# Patient Record
Sex: MALE | Race: WHITE | NOT HISPANIC OR LATINO | URBAN - METROPOLITAN AREA
[De-identification: names, ages, dates, MRNs, and addresses within clinical notes are randomized per-mention and may not be internally consistent; named-entity substitution may affect disease eponyms.]

---

## 2024-12-31 ENCOUNTER — OFFICE VISIT (OUTPATIENT)
Dept: DERMATOLOGY | Facility: CLINIC | Age: 59
End: 2024-12-31
Payer: COMMERCIAL

## 2024-12-31 DIAGNOSIS — L71.9 ROSACEA: ICD-10-CM

## 2024-12-31 DIAGNOSIS — Z85.820 PERSONAL HISTORY OF MALIGNANT MELANOMA OF SKIN: ICD-10-CM

## 2024-12-31 DIAGNOSIS — L82.1 SEBORRHEIC KERATOSIS: ICD-10-CM

## 2024-12-31 DIAGNOSIS — L57.8 DIFFUSE PHOTODAMAGE OF SKIN: ICD-10-CM

## 2024-12-31 DIAGNOSIS — D48.5 NEOPLASM OF UNCERTAIN BEHAVIOR OF SKIN: Primary | ICD-10-CM

## 2024-12-31 DIAGNOSIS — D18.01 HEMANGIOMA OF SKIN: ICD-10-CM

## 2024-12-31 DIAGNOSIS — D22.5 MELANOCYTIC NEVUS OF TRUNK: ICD-10-CM

## 2024-12-31 PROCEDURE — 11311 SHAVE SKIN LESION 0.6-1.0 CM: CPT | Performed by: DERMATOLOGY

## 2024-12-31 PROCEDURE — 11306 SHAVE SKIN LESION 0.6-1.0 CM: CPT | Performed by: DERMATOLOGY

## 2024-12-31 PROCEDURE — 99204 OFFICE O/P NEW MOD 45 MIN: CPT | Performed by: DERMATOLOGY

## 2024-12-31 RX ORDER — TADALAFIL 5 MG/1
5 TABLET ORAL
COMMUNITY
Start: 2024-09-12

## 2024-12-31 RX ORDER — PROPRANOLOL HYDROCHLORIDE 80 MG/1
80 CAPSULE, EXTENDED RELEASE ORAL 2 TIMES DAILY
COMMUNITY
Start: 2024-10-30 | End: 2025-01-28

## 2024-12-31 RX ORDER — LISINOPRIL 10 MG/1
10 TABLET ORAL
COMMUNITY
Start: 2024-09-30

## 2024-12-31 RX ORDER — METRONIDAZOLE 7.5 MG/G
CREAM TOPICAL
Qty: 45 G | Refills: 11 | Status: SHIPPED | OUTPATIENT
Start: 2024-12-31

## 2024-12-31 RX ORDER — ALFUZOSIN HYDROCHLORIDE 10 MG/1
1 TABLET, EXTENDED RELEASE ORAL NIGHTLY
COMMUNITY
Start: 2017-05-30

## 2024-12-31 RX ORDER — TRAZODONE HYDROCHLORIDE 50 MG/1
50 TABLET ORAL
COMMUNITY

## 2024-12-31 RX ORDER — SERTRALINE HYDROCHLORIDE 50 MG/1
50 TABLET, FILM COATED ORAL
COMMUNITY
Start: 2024-10-30

## 2024-12-31 RX ORDER — ATORVASTATIN CALCIUM 20 MG/1
1 TABLET, FILM COATED ORAL
COMMUNITY
Start: 2024-04-04

## 2024-12-31 ASSESSMENT — DERMATOLOGY QUALITY OF LIFE (QOL) ASSESSMENT: ARE THERE EXCLUSIONS OR EXCEPTIONS FOR THE QUALITY OF LIFE ASSESSMENT: NO

## 2024-12-31 ASSESSMENT — ITCH NUMERIC RATING SCALE: HOW SEVERE IS YOUR ITCHING?: 0

## 2024-12-31 ASSESSMENT — DERMATOLOGY PATIENT ASSESSMENT
WHERE ARE THESE NEW OR CHANGING LESIONS LOCATED: LEFT EAR
DO YOU USE SUNSCREEN: OCCASIONALLY
DO YOU USE A TANNING BED: NO
DO YOU HAVE ANY NEW OR CHANGING LESIONS: YES

## 2024-12-31 NOTE — PROGRESS NOTES
Subjective     Zain Bustillos is a 59 y.o. male who presents for the following: Skin Check.  The patient and his wife report the scaly bump on his left ear has been present for 6 months, has increased in size, and hurts to touch.  He also notes intermittent pimple breakouts on his face.  He denies any other new, changing, or concerning skin lesions; no bleeding, itching, or burning lesions.      Review of Systems:  No other skin or systemic complaints other than what is documented elsewhere in the note.    The following portions of the chart were reviewed this encounter and updated as appropriate:       Skin Cancer History  No skin cancer on file.    Specialty Problems    None      Past Dermatologic / Past Relevant Medical History:    - reported history of melanoma, the Breslow depth of which he cannot recall, on his right proximal forearm diagnosed and treated with wide local excision by his previous outside Dermatologist, Dr. Harshil Strickland, in approximately 2005, according to the patient, and for which no sentinel lymph node biopsy or any adjuvant treatment was necessary at that time, according to the patient and his wife    Family History:    No family history of melanoma or skin cancer    Social History:    The patient states he works as the  for [employer] and lives in [place] with his wife, Ciara, who states she grew up in [place] and has a daughter, Ann Marie, who grew up in [place]    Allergies:  Patient has no known allergies.    Current Medications / CAM's:    Current Outpatient Medications:     alfuzosin (Uroxatral) 10 mg 24 hr tablet, Take 1 tablet (10 mg) by mouth once daily at bedtime., Disp: , Rfl:     atorvastatin (Lipitor) 20 mg tablet, Take 1 tablet (20 mg) by mouth early in the morning.., Disp: , Rfl:     lisinopril 10 mg tablet, 1 tablet (10 mg)., Disp: , Rfl:     propranolol LA (Inderal LA) 80 mg 24 hr capsule, Take 1 capsule (80 mg) by mouth twice a day., Disp: , Rfl:      sertraline (Zoloft) 50 mg tablet, Take 1 tablet (50 mg) by mouth once daily., Disp: , Rfl:     tadalafil (Cialis) 5 mg tablet, Take 1 tablet (5 mg) by mouth once daily., Disp: , Rfl:     traZODone (Desyrel) 50 mg tablet, Take 1 tablet (50 mg) by mouth., Disp: , Rfl:     metroNIDAZOLE (Metrocream) 0.75 % cream, Apply twice daily to affected areas of face, Disp: 45 g, Rfl: 11     Objective   Well appearing patient in no apparent distress; mood and affect are within normal limits.    A full examination was performed including scalp, face, eyes, ears, nose, lips, neck, chest, axillae, abdomen, back, bilateral upper extremities, and bilateral lower extremities. All findings within normal limits unless otherwise noted below.    Assessment/Plan   1. Neoplasm of uncertain behavior of skin (2)  Left Ear Antihelix  7 mm erythematous, scaly papule               Shave removal    Lesion length (cm):  0.7  Margin per side (cm):  0  Lesion diameter (cm):  0.7  Informed consent: discussed and consent obtained    Timeout: patient name, date of birth, surgical site, and procedure verified    Procedure prep:  Patient was prepped and draped  Anesthesia: the lesion was anesthetized in a standard fashion    Anesthetic:  1% lidocaine w/ epinephrine 1-100,000 local infiltration  Instrument used: flexible razor blade    Hemostasis achieved with: aluminum chloride    Outcome: patient tolerated procedure well    Post-procedure details: sterile dressing applied and wound care instructions given    Dressing type: bandage and petrolatum      Staff Communication: Dermatology Local Anesthesia: 1 % Lidocaine / Epinephrine - Amount:0.5ml    Specimen 1 - Dermatopathology- DERM LAB  Differential Diagnosis: HAK v SCCIS v SCC v CNH  Check Margins Yes/No?:    Comments:    Dermpath Lab: Routine Histopathology (formalin-fixed tissue)    Left Lateral Neck  5 mm dark brown pigmented, asymmetric macule with an asymmetric pigment network and irregular borders                Shave removal    Lesion length (cm):  0.5  Margin per side (cm):  0.2  Lesion diameter (cm):  0.9  Informed consent: discussed and consent obtained    Timeout: patient name, date of birth, surgical site, and procedure verified    Procedure prep:  Patient was prepped and draped  Anesthesia: the lesion was anesthetized in a standard fashion    Anesthetic:  1% lidocaine w/ epinephrine 1-100,000 local infiltration  Instrument used: flexible razor blade    Hemostasis achieved with: aluminum chloride    Outcome: patient tolerated procedure well    Post-procedure details: sterile dressing applied and wound care instructions given    Dressing type: bandage and petrolatum      Staff Communication: Dermatology Local Anesthesia: 1 % Lidocaine / Epinephrine - Amount:0.5ml    Specimen 2 - Dermatopathology- DERM LAB  Differential Diagnosis: DN  Check Margins Yes/No?:    Comments:    Dermpath Lab: Routine Histopathology (formalin-fixed tissue)    2. Melanocytic nevus of trunk  Scattered on the patient's face, neck, trunk, and extremities, there are several small, round- to oval-shaped, brown-pigmented and pink-colored, symmetric, uniform-appearing macules and dome-shaped papules    Clinically benign- to slightly atypical-appearing nevi - the clinically benign- to slightly atypical-appearing nature of the remainder of the patient's nevi was discussed with the patient today.  None of the patient's nevi, with the exception of the one noted above, meet threshold for biopsy today.  I emphasized the importance of performing monthly self-skin exams using the ABCDs of monitoring moles, which were reviewed with the patient today and an informational hand-out provided.  I also emphasized the importance of sun avoidance and sun protection with daily sunscreen use.    3. Seborrheic keratosis  Scattered on the patient's face, neck, trunk, and extremities, there are multiple tan- to light brown-colored, hyperkeratotic, stuck-on  appearing papules of varying size and shape    Seborrheic Keratoses - the benign nature of these lesions was discussed with the patient today and reassurance provided.  No treatment is medically indicated for these lesions at this time.    4. Hemangioma of skin  Scattered on the patient's face, neck, trunk, and extremities, there are multiple small, round, cherry red- to purplish-colored, symmetric, uniform, vascular-appearing macules and papules    Cherry Angiomas - the benign nature of these vascular lesions was discussed with the patient today and reassurance provided.  No treatment is medically indicated for these lesions at this time.    5. Rosacea  Head - Anterior (Face)  On the patient's face, most prominent over the bilateral medial cheeks and nose, there is moderate underlying erythema with several overlying telangiectasias and a few scattered erythematous, inflammatory papules     Rosacea -papulo-pustular type.  The chronic and intermittently flaring nature of this condition and treatment options were discussed extensively with the patient today.  At this time, I recommend topical therapy with MetroCream 0.75%, which the patient was instructed to apply twice daily to the affected areas of the face.  I also discussed the various triggers of rosacea with the patient today, especially sun exposure, and emphasized the importance of trigger avoidance, particularly sun avoidance and sun protection with daily sunscreen use.  The risks, benefits, and side effects of this medication were discussed.  The patient expressed understanding and is in agreement with this plan.    metroNIDAZOLE (Metrocream) 0.75 % cream - Head - Anterior (Face)  Apply twice daily to affected areas of face    6. Personal history of malignant melanoma of skin  The sites of prior melanoma excision were examined.  There is no evidence of recurrent growth or pigmentation or recurrent disease, satellite papules, or nodules on exam  today.    Reported history of melanoma and photodamage.  There is no evidence of local, regional, or distant recurrent disease or any new primary melanomas on exam today.  I emphasized the importance of continuing to perform monthly self-skin and lymph node exams using the ABCDs of monitoring moles, which were reviewed with the patient, as well as the importance of sun avoidance and sun protection with daily sunscreen use.  I will have the patient return to our office in 1 year, pending the above biopsy results, for routine melanoma follow-up and total body skin exam, and the patient was instructed to call our office should the patient notice any new, changing, symptomatic, or otherwise concerning skin lesions before then.  The patient expressed understanding and is in agreement with this plan.    7. Diffuse photodamage of skin  Photodistributed  Diffuse photodamage with actinic changes with telangiectasia and mottled pigmentation in sun-exposed areas.    Photodamage.  The signs and symptoms of skin cancer were reviewed and the patient was advised to practice sun protection and sun avoidance, use daily sunscreen, and perform regular self skin exams.  Sun protection was discussed, including avoiding the mid-day sun, wearing a sunscreen with SPF at least 50, and stressing the need for reapplication of sunscreen and applying more than they think they need.

## 2025-01-06 LAB
LAB AP ASR DISCLAIMER: NORMAL
LABORATORY COMMENT REPORT: NORMAL
PATH REPORT.FINAL DX SPEC: NORMAL
PATH REPORT.GROSS SPEC: NORMAL
PATH REPORT.MICROSCOPIC SPEC OTHER STN: NORMAL
PATH REPORT.RELEVANT HX SPEC: NORMAL
PATH REPORT.TOTAL CANCER: NORMAL

## 2025-01-07 ENCOUNTER — TELEPHONE (OUTPATIENT)
Dept: DERMATOLOGY | Facility: CLINIC | Age: 60
End: 2025-01-07
Payer: COMMERCIAL

## 2025-01-28 ENCOUNTER — APPOINTMENT (OUTPATIENT)
Dept: DERMATOLOGY | Facility: CLINIC | Age: 60
End: 2025-01-28
Payer: COMMERCIAL

## 2025-01-28 DIAGNOSIS — D04.22 SQUAMOUS CELL CARCINOMA IN SITU (SCCIS) OF SKIN OF ANTIHELIX OF LEFT EAR: Primary | ICD-10-CM

## 2025-01-28 PROCEDURE — 17312 MOHS ADDL STAGE: CPT | Performed by: DERMATOLOGY

## 2025-01-28 PROCEDURE — 17311 MOHS 1 STAGE H/N/HF/G: CPT | Performed by: DERMATOLOGY

## 2025-01-28 PROCEDURE — 99214 OFFICE O/P EST MOD 30 MIN: CPT | Performed by: DERMATOLOGY

## 2025-01-28 NOTE — PROGRESS NOTES
Mohs Surgery Operative Note    Date of Surgery:  1/28/2025  Surgeon:  Iwona Servin MD  Office Location:  [address] [facility]  [address] [facility]  [address]  [facility] [address]  Dept: [phone]  Dept Fax: [phone]  Referring Provider: Kee Stevens MD  [address]  [place] [address]      Assessment/Plan   Pre-procedure:   Obtained informed consent: written from patient  The surgical site was identified and confirmed with the patient.     Intra-operative:   Audible time out called at : 10:55 AM 01/28/25  by: Wilman Munguia MA   Verified patient name, birthdate, site, specimen bottle label & requisition.    The planned procedure(s) was again reviewed with the patient. The risks of bleeding, infection, nerve damage and scarring were reviewed. Written authorization was obtained. The patient identity, surgical site, and planned procedure(s) were verified. The provider acted as both surgeon and pathologist.     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  Left Ear Antihelix  Mohs surgery  Consent obtained: written    Universal Protocol:  Procedure explained and questions answered to patient or proxy's satisfaction: Yes    Test results available and properly labeled: Yes    Pathology report reviewed: Yes    External notes reviewed: Yes    Photo or diagram used for site identification: Yes    Site/side marked: Yes    Slide independently reviewed by Mohs surgeon: Yes    Immediately prior to procedure a time out was called: Yes    Patient identity confirmed: verbally with patient  Preparation: Patient was prepped and draped in usual sterile fashion      Anticoagulation:  Was the anticoagulation regimen changed prior to Mohs? No      Anesthesia:  Anesthesia method: local infiltration  Local anesthetic: lidocaine 2% WITH epi    Procedure Details:  Case ID Number: [id number]  Biopsy accession number: [id number]  Date of  biopsy: 12/31/2024  Pre-Op diagnosis: squamous cell carcinoma  SCC subtype: in situ  Surgical site (from skin exam): Left Ear Antihelix  Indications for Mohs surgery: anatomic location where tissue conservation is critical  Previously treated? No      Micrographic Surgery Details:  Post-operative length (cm): 0.8  Post-operative width (cm): 1.3  Number of Mohs stages: 2    Stage 1     Comments: The patient was brought into the operating room and placed in the procedure chair in the appropriate position.  The area positive by previous biopsy was identified and confirmed with the patient. The area of clinically obvious tumor was debulked using a curette and/or scalpel as needed. An incision was made following the Mohs approach through the skin. The specimen was taken to the lab, divided into 1 piece(s) and appropriately chromacoded and processed..  Tumor was seen on the lateral margins as indicated on the on the Mohs map.  Squamous cell carcinoma in situ. Histologic examination revealed enlarged, atypical keratinocytes with large nuclear to cytoplasmic ratio extending throughout the full thickness of an epidermis.      Tumor features identified on Mohs section: Squamous Cell Carcinoma in Situ      Depth of invasion: Epidermis    Stage 2     Comments: The area of positivity as noted on the Mohs map in the previous stage was identified and removed using the Mohs technique. The specimen was taken to the lab and appropriately chromacoded and processed in 1 piece(s).     Tumor features identified on Mohs section: no tumor identified  Depth of defect: subcutaneous fat    Patient tolerance of procedure: tolerated well, no immediate complications    Reconstruction:  Was the defect reconstructed?: No   Repair: After a discussion with the patient regarding the options for wound closure, a decision was made to proceed with second intention healing.  Dressing/Follow-up: Surgifoam was placed in the wound. A pressure dressing was  placed to help stabilize the wound and to minimize the risk of postoperative bleeding. Wound care was discussed, and the patient was given written post-operative wound care instructions.  Hemostasis achieved with: pressure and Gelfoam  Outcome: patient tolerated procedure well with no complications    Post-procedure details: sterile dressing applied and wound care instructions given    Dressing type: Gelfoam and pressure dressing    Additional details:  Repair: After a discussion with the patient regarding the options for wound closure, a decision was made to proceed with second intention healing.  Dressing F/U: Surgifoam was placed in the wound. A pressure dressing was placed to help stabilize the wound and to minimize the risk of postoperative bleeding. Wound care was discussed, and the patient was given written post-operative wound care instructions.    The final repair measured 0.8 x 1.3 cm          Wound care was discussed, and the patient was given written post-operative wound care instructions.      The patient will follow up with Iwona Servin MD as needed for any post operative problems or concerns, and will follow up with their primary dermatologist as scheduled.

## 2025-01-28 NOTE — PROGRESS NOTES
Office Visit Note  Date: 1/28/2025  Surgeon:  Iwona Servin MD  Office Location: DO [address] [facility]  [address]  [address]  [facility] [address]  Dept: [phone]  Dept Fax: [phone]  Referring Provider: Kee Stevens MD  [address], [address]  [place] [address]    Subjective   Zain Bustillos is a 59 y.o. male who presents for the following: MOHS Surgery    According to the patient, the lesion has been present for approximately greater than 1 year at the time of diagnosis.  The lesion is painful.  The lesion has not been treated previously.    The patient does not have a pacemaker / defibrillator.  The patient does not have a heart valve / joint replacement.    The patient is not on blood thinners.  The patient does not have a history of hepatitis B or C.  The patient does not have a history of HIV.  The patient does not have a history of immunosuppression (e.g. organ transplantation, malignancy, medications)    Review of Systems:  No other skin or systemic complaints other than what is documented elsewhere in the note.    MEDICAL HISTORY: clinically relevant history including significant past medical history, medications and allergies was reviewed and documented in Epic.    Objective   Well appearing patient in no apparent distress; mood and affect are within normal limits.  Vital signs: See record.  Noted on the Left Antihelix  Is a 0.9 x 0.6 cm scar      The patient confirmed the identified site.    Discussion:  The nature of the diagnosis was explained. The lesion is a skin cancer.  It has a risk of local growth and distant spread. The condition is associated with sun exposure.  Warning signs of non-melanoma skin cancer discussed. Patient was instructed to perform monthly self skin examination.  We recommended that the patient have regular full skin exams given an increased risk of subsequent skin cancers. The patient was instructed to use  sun protective behaviors including use of broad spectrum sunscreens and sun protective clothing to reduce risk of skin cancers.      Risks, benefits, side effects of Mohs surgery were discussed with patient and the patient voiced understanding.  It was explained that even though the cure rate of Mohs is very high it is not 100%. Risks of surgery including but not limited to bleeding, infection, numbness, nerve damage, and scar were reviewed.  Discussion included wound care requirements, activity restrictions, likely scar outcome and time to heal.     After Mohs surgery, the defect may need to be repaired surgically and the scar may be longer than the original lesion.  Reconstruction options, risks, and benefits were reviewed including second intention healing, linear repair (4-1 ratio was explained), local flaps, skin grafts, cartilage grafts and interpolation flaps (the need for multiple surgeries was explained). Possible outcomes were reviewed including likely scar appearance, failure of flap survival, infection, bleeding and the need for revision surgery.     The pathology was reviewed, the photograph was reviewed, and the referring physician's note was reviewed.    Patient elected for Mohs surgery.

## 2025-03-11 ENCOUNTER — APPOINTMENT (OUTPATIENT)
Dept: DERMATOLOGY | Facility: CLINIC | Age: 60
End: 2025-03-11
Payer: COMMERCIAL

## 2025-05-27 ENCOUNTER — OFFICE VISIT (OUTPATIENT)
Dept: URGENT CARE | Facility: URGENT CARE | Age: 60
End: 2025-05-27
Payer: COMMERCIAL

## 2025-05-27 VITALS
SYSTOLIC BLOOD PRESSURE: 146 MMHG | RESPIRATION RATE: 18 BRPM | TEMPERATURE: 99.1 F | HEART RATE: 104 BPM | BODY MASS INDEX: 36.16 KG/M2 | OXYGEN SATURATION: 95 % | WEIGHT: 274.03 LBS | DIASTOLIC BLOOD PRESSURE: 97 MMHG

## 2025-05-27 DIAGNOSIS — J02.9 SORE THROAT: ICD-10-CM

## 2025-05-27 DIAGNOSIS — J01.00 ACUTE NON-RECURRENT MAXILLARY SINUSITIS: Primary | ICD-10-CM

## 2025-05-27 LAB
POC HUMAN RHINOVIRUS PCR: POSITIVE
POC INFLUENZA A VIRUS PCR: NEGATIVE
POC INFLUENZA B VIRUS PCR: NEGATIVE
POC RESPIRATORY SYNCYTIAL VIRUS PCR: NEGATIVE
POC STREPTOCOCCUS PYOGENES (GROUP A STREP) PCR: NEGATIVE

## 2025-05-27 RX ORDER — AMOXICILLIN AND CLAVULANATE POTASSIUM 875; 125 MG/1; MG/1
875 TABLET, FILM COATED ORAL 2 TIMES DAILY
Qty: 20 TABLET | Refills: 0 | Status: SHIPPED | OUTPATIENT
Start: 2025-05-27

## 2025-05-27 ASSESSMENT — ENCOUNTER SYMPTOMS
SINUS PAIN: 1
DIARRHEA: 0
RHINORRHEA: 1
SORE THROAT: 1
FEVER: 1
NAUSEA: 0
VOMITING: 0
HEADACHES: 1
FATIGUE: 1
SINUS PRESSURE: 1
CHILLS: 0
COUGH: 1
WHEEZING: 0

## 2025-05-27 NOTE — PROGRESS NOTES
Subjective   Patient ID: Zain Bustillos is a 60 y.o. male. They present today with a chief complaint of Illness (Sore throat, headaches, congestion since last Thursday, worsening in the last 2 days.).    History of Present Illness  See mdm      History provided by:  Patient      Past Medical History  Allergies as of 05/27/2025    (No Known Allergies)       Prescriptions Prior to Admission[1]     Medical History[2]    Surgical History[3]     reports that he has never smoked. He has never used smokeless tobacco.    Review of Systems  Review of Systems   Constitutional:  Positive for fatigue and fever. Negative for chills.   HENT:  Positive for congestion, postnasal drip, rhinorrhea, sinus pressure, sinus pain and sore throat. Negative for nosebleeds.    Respiratory:  Positive for cough. Negative for wheezing.    Cardiovascular:  Negative for chest pain.   Gastrointestinal:  Negative for diarrhea, nausea and vomiting.   Neurological:  Positive for headaches.   All other systems reviewed and are negative.                                 Objective    Vitals:    05/27/25 1936   BP: (!) 146/97   BP Location: Left arm   Patient Position: Sitting   BP Cuff Size: Adult long   Pulse: 104   Resp: 18   Temp: 37.3 °C (99.1 °F)   TempSrc: Oral   SpO2: 95%   Weight: 124 kg (274 lb 0.5 oz)     No LMP for male patient.    Physical Exam  Vitals and nursing note reviewed.   HENT:      Head: Normocephalic.      Right Ear: Ear canal normal.      Left Ear: Ear canal normal.      Nose: Nasal tenderness, mucosal edema, congestion and rhinorrhea present. Rhinorrhea is purulent.      Right Turbinates: Enlarged and swollen.      Left Turbinates: Enlarged and swollen.      Right Sinus: Maxillary sinus tenderness and frontal sinus tenderness present.      Left Sinus: Maxillary sinus tenderness and frontal sinus tenderness present.      Mouth/Throat:      Lips: Pink.      Mouth: Mucous membranes are moist.      Pharynx: Oropharynx is clear.  Uvula midline.   Cardiovascular:      Rate and Rhythm: Normal rate and regular rhythm.      Heart sounds: Normal heart sounds.   Pulmonary:      Effort: Pulmonary effort is normal.      Breath sounds: Normal breath sounds.   Neurological:      General: No focal deficit present.      Mental Status: He is alert and oriented to person, place, and time.         Procedures    Point of Care Test & Imaging Results from this visit  No results found for this visit on 05/27/25.   Imaging  No results found.    Cardiology, Vascular, and Other Imaging  No other imaging results found for the past 2 days      Diagnostic study results (if any) were reviewed by Crystal L Severino, APRN-CNP.    Assessment/Plan   Allergies, medications, history, and pertinent labs/EKGs/Imaging reviewed by Crystal L Severino, APRN-CNP.     Medical Decision Making  60-year-old male patient presents with chief complaint of sore throat, headaches, congestion that all started last Thursday.  He states over the last couple of days he has had an increase in his cough with production of brown discolored sputum.  He states he is having a lot of thick yellow discharge from his nose as well.  And he has just began to start running a fever.  States he has been taking over-the-counter Tylenol and ibuprofen and some cough suppressants which are not working at this time.    Orders and Diagnoses  Diagnoses and all orders for this visit:  Acute non-recurrent maxillary sinusitis  -     amoxicillin-clavulanate (Augmentin) 875-125 mg tablet; Take 1 tablet (875 mg) by mouth 2 times a day.  Sore throat  -     POCT SPOTFIRE R/ST Panel Mini w/Strep A ([facility]) manually resulted   Post Nasal Drip:  Claritin or Zyrtec everyday for one week   Humidified air  OTC nasal spray as needed   Mucinex DM  Tylenol/ibuprofen as needed for pain/discomfort      Medical Admin Record      Patient disposition: Home    Electronically signed by Crystal L Severino, APRN-CNP  7:57 PM            [1] (Not in a hospital admission)  [2]   Past Medical History:  Diagnosis Date    Disorder of urinary system, unspecified     Urinary problem in male    Personal history of other diseases of the circulatory system     History of hypertension    Personal history of other mental and behavioral disorders     History of depression    Tremor, unspecified     Occasional tremors   [3] No past surgical history on file.

## 2025-06-24 ENCOUNTER — APPOINTMENT (OUTPATIENT)
Dept: DERMATOLOGY | Facility: CLINIC | Age: 60
End: 2025-06-24
Payer: COMMERCIAL

## 2025-06-28 ENCOUNTER — OFFICE VISIT (OUTPATIENT)
Dept: URGENT CARE | Facility: URGENT CARE | Age: 60
End: 2025-06-28
Payer: COMMERCIAL

## 2025-06-28 VITALS
HEART RATE: 89 BPM | BODY MASS INDEX: 35.96 KG/M2 | OXYGEN SATURATION: 95 % | TEMPERATURE: 100 F | RESPIRATION RATE: 18 BRPM | DIASTOLIC BLOOD PRESSURE: 83 MMHG | WEIGHT: 272.49 LBS | SYSTOLIC BLOOD PRESSURE: 127 MMHG

## 2025-06-28 DIAGNOSIS — J98.8 WHEEZING-ASSOCIATED RESPIRATORY INFECTION: Primary | ICD-10-CM

## 2025-06-28 DIAGNOSIS — J02.9 SORE THROAT: ICD-10-CM

## 2025-06-28 DIAGNOSIS — H10.13 ALLERGIC CONJUNCTIVITIS OF BOTH EYES: ICD-10-CM

## 2025-06-28 DIAGNOSIS — J01.00 ACUTE NON-RECURRENT MAXILLARY SINUSITIS: ICD-10-CM

## 2025-06-28 DIAGNOSIS — Z20.822 EXPOSURE TO COVID-19 VIRUS: ICD-10-CM

## 2025-06-28 LAB
POC HUMAN RHINOVIRUS PCR: NEGATIVE
POC INFLUENZA A VIRUS PCR: NEGATIVE
POC INFLUENZA B VIRUS PCR: NEGATIVE
POC RESPIRATORY SYNCYTIAL VIRUS PCR: NEGATIVE
POC STREPTOCOCCUS PYOGENES (GROUP A STREP) PCR: NEGATIVE

## 2025-06-28 RX ORDER — ALBUTEROL SULFATE 90 UG/1
2 INHALANT RESPIRATORY (INHALATION) EVERY 4 HOURS PRN
Qty: 8.5 G | Refills: 0 | Status: SHIPPED | OUTPATIENT
Start: 2025-06-28 | End: 2026-06-28

## 2025-06-28 RX ORDER — INHALER, ASSIST DEVICES
SPACER (EA) MISCELLANEOUS
Qty: 1 EACH | Refills: 0 | Status: SHIPPED | OUTPATIENT
Start: 2025-06-28 | End: 2026-06-28

## 2025-06-28 RX ORDER — OLOPATADINE HYDROCHLORIDE 1 MG/ML
1 SOLUTION OPHTHALMIC 2 TIMES DAILY
Qty: 5 ML | Refills: 1 | Status: SHIPPED | OUTPATIENT
Start: 2025-06-28 | End: 2025-07-28

## 2025-06-28 RX ORDER — PRIMIDONE 50 MG/1
50 TABLET ORAL NIGHTLY
COMMUNITY
Start: 2025-04-10 | End: 2025-07-09

## 2025-06-28 RX ORDER — DOXYCYCLINE 100 MG/1
100 CAPSULE ORAL 2 TIMES DAILY
Qty: 20 CAPSULE | Refills: 0 | Status: SHIPPED | OUTPATIENT
Start: 2025-06-28 | End: 2025-07-08

## 2025-06-28 RX ORDER — PREDNISONE 10 MG/1
40 TABLET ORAL DAILY
Qty: 20 TABLET | Refills: 0 | Status: SHIPPED | OUTPATIENT
Start: 2025-06-28 | End: 2025-07-03

## 2025-06-28 RX ORDER — LORATADINE 10 MG/1
10 TABLET ORAL DAILY
COMMUNITY

## 2025-06-28 RX ORDER — FLUTICASONE PROPIONATE 50 MCG
1 SPRAY, SUSPENSION (ML) NASAL DAILY
COMMUNITY

## 2025-06-28 RX ORDER — DOXYCYCLINE 100 MG/1
100 CAPSULE ORAL 2 TIMES DAILY
Qty: 20 CAPSULE | Refills: 0 | Status: SHIPPED | OUTPATIENT
Start: 2025-06-28 | End: 2025-06-28

## 2025-06-28 RX ORDER — PREDNISONE 20 MG/1
40 TABLET ORAL DAILY
Qty: 10 TABLET | Refills: 0 | Status: SHIPPED | OUTPATIENT
Start: 2025-06-28 | End: 2025-06-28 | Stop reason: ALTCHOICE

## 2025-06-28 RX ORDER — ALBUTEROL SULFATE 0.83 MG/ML
2.5 SOLUTION RESPIRATORY (INHALATION) ONCE
Status: COMPLETED | OUTPATIENT
Start: 2025-06-28 | End: 2025-06-28

## 2025-06-28 RX ADMIN — ALBUTEROL SULFATE 2.5 MG: 0.83 SOLUTION RESPIRATORY (INHALATION) at 19:21

## 2025-06-28 ASSESSMENT — ENCOUNTER SYMPTOMS
LOSS OF SENSATION IN FEET: 0
FEVER: 1
COUGH: 1
SORE THROAT: 1
DEPRESSION: 0
OCCASIONAL FEELINGS OF UNSTEADINESS: 0

## 2025-06-28 NOTE — PROGRESS NOTES
Subjective   Patient ID: Zain Bustillos is a 60 y.o. male present today with a chief complaint of Cough, Sore Throat, Fever, and Nasal Congestion (1 week of symptoms, same symptoms he had 1 month ago, never really went away).    History of Present Illness    Cough  Associated symptoms include a fever and a sore throat.   Sore Throat   Associated symptoms include coughing.   Fever   Associated symptoms include coughing and a sore throat.     Pt was seen for similar issues on 5/27/25 tested positive for rhinovirus and treated with augmentin for sinusitis. He completed antibiotic and reports mild improvement but no resolution of cough or congestion. He reports wheeze, productive cough, spasm of coughing and right rib pain over last week. He reports he had right rib pain since November worse with bending over. Pt has been checking pulse ox at home and reports baseline 95%. He does not recall using albuterol in the past. Pt reports feeling Feverish and chills since yesterday. He flew back on Thursday from [place]. He is taking claritin for past 2 weeks but admits to missing couple doses. He also takes muciex with no relief of cough. He denies any illicit drugs, alcohol or smoking. He reports red irritated eyes for past couple months improve temporarily with OTC eye drops. FH of mother with glaucoma.  He reports negative home covid test today. Pt reports he had mild tremor since he was a child, now affecting his voice. His parents also with mild tremor.    Past Medical History  Allergies as of 06/28/2025    (No Known Allergies)       Prescriptions Prior to Admission[1]     Medical History[2]    Surgical History[3]     reports that he has never smoked. He has never used smokeless tobacco.    Review of Systems  Review of Systems   Constitutional:  Positive for fever.   HENT:  Positive for sore throat.    Respiratory:  Positive for cough.                                   Objective    Vitals:    06/28/25 1838   BP:  127/83   Pulse: 89   Resp: 18   Temp: 37.8 °C (100 °F)   TempSrc: Oral   SpO2: 95%   Weight: 124 kg (272 lb 7.8 oz)     No LMP for male patient.    Physical Exam  Constitutional:       Appearance: Normal appearance. He is obese.   HENT:      Head: Normocephalic and atraumatic.      Right Ear: Tympanic membrane normal.      Left Ear: Tympanic membrane normal.      Nose: Congestion and rhinorrhea present.      Mouth/Throat:      Mouth: Mucous membranes are moist.      Pharynx: Posterior oropharyngeal erythema present. No oropharyngeal exudate.   Eyes:      Extraocular Movements: Extraocular movements intact.      Pupils: Pupils are equal, round, and reactive to light.      Comments: Injected B/L conjunctiva w/o discharge, mild exophthalmos, wearing glasses   Neck:      Comments: Large neck diameter  Cardiovascular:      Rate and Rhythm: Normal rate and regular rhythm.      Heart sounds: No murmur heard.     Comments: No chest tenderness  Pulmonary:      Effort: Pulmonary effort is normal.      Comments: Diminished breath sounds with exp wheezes; improved aeration and resolution of wheezes after albuterol neb  Musculoskeletal:         General: Normal range of motion.      Cervical back: Normal range of motion and neck supple.   Lymphadenopathy:      Cervical: No cervical adenopathy.   Skin:     General: Skin is warm.      Capillary Refill: Capillary refill takes 2 to 3 seconds.      Findings: No rash.   Neurological:      General: No focal deficit present.      Mental Status: He is alert.      Motor: No weakness.      Gait: Gait normal.      Comments: Mild tremor with use of extremities and head   Psychiatric:         Mood and Affect: Mood normal.         Procedures    Point of Care Test & Imaging Results from this visit  Results for orders placed or performed in visit on 06/28/25   POCT SPOTFIRE R/ST Panel [facility] w[facility]Strep [facility] manually resulted   Result Value Ref Range    POC Group A Strep, PCR Negative  Negative    POC Respiratory Syncytial Virus PCR Negative Negative    POC Influenza A Virus PCR Negative Negative    POC Influenza B Virus PCR Negative Negative    POC Human Rhinovirus PCR Negative Negative      Imaging  No results found.    Cardiology, Vascular, and Other Imaging  No other imaging results found for the past 2 days      Diagnostic study results (if any) were reviewed by Kath Renae PA-C.    Assessment/Plan   Allergies, medications, history, and pertinent labs/EKGs/Imaging reviewed by Kath Renae PA-C.     Medical Decision Making  60 y.o. male present today with a chief complaint of productive Cough with wheeze, Sore Throat, Fever, and Nasal Congestion (1 week of symptoms, same symptoms he had 1 month ago, never really went away).  Reviewed vitals fever 100, SpO2 95%. Exam remarkable for Injected B/L conjunctiva w/o discharge, nasal congestion, rhinorrhea, pharyngeal erythema without exudate, mildly diminished breath sounds with exp wheezes without respiratory distress, mild tremor with activity.    Discussed with pt treatment for Wheezing associated respiratory illness- suspect reactive airway disease vs mucus plugging vs pneumonia vs CHF. Albuterol neb trialed, Preneb O2 95%, Post neb O2 96% with improved air movement. Advised to continue albuterol inhaler 2 puffs every 6hr as needed for spasmatic cough or wheeze, use with spacer. Advised to start Prednisone 40mg daily x 5 days; take with food to decrease GI upset. May use OTC Mucinex or Robitussin with 2 L water per day to help loosen congestion. Return for chest xray if not improving in 2-3 days. Monitor weight daily.   Go to ER if chest pain or difficulty breathing or dizziness or fever 103 with headache or neck stiffness.    Acute recurrent sinusitis- completed augmentin couple weeks ago. Advised to start Doxycycline twice a day x 10 days; take with food and probiotic (culturelle). Continue claritin daily for seasonal  allergies. May use flonase 1 spray each nostril twice a day x 3 days as needed for congestion.    Acute sore throat with exposure to covid- 5 Respiratory PCR panel negative for  human rhinovirus, influenza A & B, RSV and Group A Strep. Recommend warm salt water gargles 2-3 times a day, throat lozenges (cepacol), tylenol 500mg every 6hr for pain (max 4000mg in 24 hr).    Allergic conjunctivitis- start Patanol eye drops twice a day; follow up with ophthalmology for dry eye treatment    Orders and Diagnoses  Diagnoses and all orders for this visit:  Wheezing-associated respiratory infection  -     albuterol 2.5 mg /3 mL (0.083 %) nebulizer solution 2.5 mg  -     albuterol (ProAir HFA) 90 mcg/actuation inhaler; Inhale 2 puffs every 4 hours if needed for wheezing or shortness of breath.  -     inhalational spacing device (BreatheRite MDI Spacer) inhaler; Use as instructed with albuterol inhaler  -     predniSONE (Deltasone) 10 mg tablet; Take 4 tablets (40 mg) by mouth once daily for 5 days.  Acute non-recurrent maxillary sinusitis  -     doxycycline (Vibramycin) 100 mg capsule; Take 1 capsule (100 mg) by mouth 2 times a day for 10 days. Take with at least 8 ounces (large glass) of water, do not lie down for 30 minutes after  Sore throat  -     POCT SPOTFIRE R/ST Panel Mini w/Strep A ([facility]) manually resulted  Exposure to COVID-19 virus  Allergic conjunctivitis of both eyes  -     olopatadine (Patanol) 0.1 % ophthalmic solution; Administer 1 drop into both eyes 2 times a day.      Medical Admin Record  Administrations This Visit       albuterol 2.5 mg /3 mL (0.083 %) nebulizer solution 2.5 mg       Admin Date  06/28/2025 Action  Given Dose  2.5 mg Route  nebulization Documented By  Geeta White MA                    Patient disposition: Home    Electronically signed by Kath Renae PA-C  7:59 PM           [1] (Not in a hospital admission)   [2]   Past Medical History:  Diagnosis Date    Disorder of  urinary system, unspecified     Urinary problem in male    Personal history of other diseases of the circulatory system     History of hypertension    Personal history of other mental and behavioral disorders     History of depression    Tremor, unspecified     Occasional tremors   [3] No past surgical history on file.

## 2025-06-28 NOTE — PATIENT INSTRUCTIONS
Wheezing associated respiratory illness- Albuterol neb trialed, Preneb O2 95%, Post neb O2 96% with improved air movement. Advised to continue albuterol inhaler 2 puffs every 6hr as needed for spasmatic cough or wheeze, use with spacer. Advised to start Prednisone 40mg daily x 5 days; take with food to decrease GI upset. May use OTC Mucinex or Robitussin with 2 L water per day to help loosen congestion. Return for chest xray if not improving in 2-3 days.  Go to ER if chest pain or difficulty breathing or dizziness or fever 103 with headache or neck stiffness.    Acute recurrent sinusitis- completed augmentin couple weeks ago. Advised to start Doxycycline twice a day x 10 days; take with food and probiotic (culturelle). Continue claritin daily for seasonal allergies. May use flonase 1 spray each nostril twice a day x 3 days as needed for congestion.    Acute sore throat with exposure to covid- 5 Respiratory PCR panel negative for  human rhinovirus, influenza A & B, RSV and Group A Strep. Recommend warm salt water gargles 2-3 times a day, throat lozenges (cepacol), tylenol 500mg every 6hr for pain (max 4000mg in 24 hr).    Allergic conjunctivitis- start Patanol eye drops twice a day; follow up with ophthalmology for dry eye treatment

## 2025-06-30 ENCOUNTER — OFFICE VISIT (OUTPATIENT)
Dept: URGENT CARE | Facility: URGENT CARE | Age: 60
End: 2025-06-30
Payer: COMMERCIAL

## 2025-06-30 ENCOUNTER — ANCILLARY PROCEDURE (OUTPATIENT)
Dept: URGENT CARE | Facility: URGENT CARE | Age: 60
End: 2025-06-30
Payer: COMMERCIAL

## 2025-06-30 ENCOUNTER — APPOINTMENT (OUTPATIENT)
Dept: DERMATOLOGY | Facility: CLINIC | Age: 60
End: 2025-06-30
Payer: COMMERCIAL

## 2025-06-30 VITALS
RESPIRATION RATE: 20 BRPM | WEIGHT: 273.59 LBS | HEART RATE: 53 BPM | HEIGHT: 73 IN | TEMPERATURE: 98.6 F | OXYGEN SATURATION: 94 % | SYSTOLIC BLOOD PRESSURE: 138 MMHG | DIASTOLIC BLOOD PRESSURE: 66 MMHG | BODY MASS INDEX: 36.26 KG/M2

## 2025-06-30 DIAGNOSIS — R05.9 COUGH, UNSPECIFIED TYPE: Primary | ICD-10-CM

## 2025-06-30 DIAGNOSIS — R05.9 COUGH, UNSPECIFIED TYPE: ICD-10-CM

## 2025-06-30 PROCEDURE — 99499 UNLISTED E&M SERVICE: CPT | Performed by: PHYSICIAN ASSISTANT

## 2025-06-30 PROCEDURE — 99024 POSTOP FOLLOW-UP VISIT: CPT | Performed by: PHYSICIAN ASSISTANT

## 2025-06-30 PROCEDURE — 1036F TOBACCO NON-USER: CPT | Performed by: PHYSICIAN ASSISTANT

## 2025-06-30 PROCEDURE — 71046 X-RAY EXAM CHEST 2 VIEWS: CPT | Performed by: PHYSICIAN ASSISTANT

## 2025-06-30 PROCEDURE — 3008F BODY MASS INDEX DOCD: CPT | Performed by: PHYSICIAN ASSISTANT

## 2025-06-30 RX ORDER — BENZONATATE 200 MG/1
200 CAPSULE ORAL 3 TIMES DAILY PRN
Qty: 21 CAPSULE | Refills: 0 | Status: SHIPPED | OUTPATIENT
Start: 2025-06-30 | End: 2025-07-07

## 2025-06-30 NOTE — PROGRESS NOTES
"Subjective   Patient ID: Zain Bustillos is a 60 y.o. male present today with a chief complaint of Follow-up (Here on Saturday, would like chest xray today, had coughing episode today at work).    History of Present Illness  HPI  Pt reports he was feeling alittle better with prescription medications but had a meeting today and had severe coughing spasm when he tried to talk. He reports Sunday home pulse 93-95% and today home pulse ox was 96%. Pt has cardiology appt July 7th for FH of CHF. He denies fever. Chest pain or shortness of breath.no leg swelling today.    Past Medical History  Allergies as of 06/30/2025    (No Known Allergies)       Prescriptions Prior to Admission[1]     Medical History[2]    Surgical History[3]     reports that he has never smoked. He has never used smokeless tobacco.    Review of Systems  Review of Systems                               Objective    Vitals:    06/30/25 1843   BP: 138/66   Pulse: 53   Resp: 20   Temp: 37 °C (98.6 °F)   TempSrc: Oral   SpO2: 94%   Weight: 124 kg (273 lb 9.5 oz)   Height: 1.854 m (6' 1\")     No LMP for male patient.    Physical Exam  Constitutional:       Appearance: Normal appearance. He is obese.   HENT:      Nose: Congestion present.      Mouth/Throat:      Mouth: Mucous membranes are moist.   Eyes:      Extraocular Movements: Extraocular movements intact.      Conjunctiva/sclera: Conjunctivae normal.      Comments: glasses   Cardiovascular:      Rate and Rhythm: Normal rate and regular rhythm.      Heart sounds: No murmur heard.  Pulmonary:      Effort: Pulmonary effort is normal. No respiratory distress.      Comments: Mildly diminished breath sounds  Musculoskeletal:         General: No swelling. Normal range of motion.      Cervical back: Normal range of motion.   Skin:     General: Skin is warm.      Capillary Refill: Capillary refill takes 2 to 3 seconds.      Findings: No rash.   Neurological:      General: No focal deficit present.      Mental " Status: He is alert.         Procedures    Point of Care Test & Imaging Results from this visit  No results found for this visit on 06/30/25.   Imaging  XR chest 2 views  Result Date: 6/30/2025  No acute cardiopulmonary process.   MACRO: None   Signed by: Steff Wakefield 6/30/2025 7:32 PM Dictation workstation:   [id number]      Cardiology, Vascular, and Other Imaging  No other imaging results found for the past 2 days      Diagnostic study results (if any) were reviewed by Kath Renae PA-C.    Assessment/Plan   Allergies, medications, history, and pertinent labs/EKGs/Imaging reviewed by Kath Renae PA-C.     Medical Decision Making  60 y.o. male present today with a chief complaint of Follow-up (Here on Saturday, would like chest xray today, had coughing episode today at work).  Reviewed vitals stable. Exam remarkable for well appearing, mild nasal congestion, rhinorrhea, pharyngeal erythema without exudate, mildly diminished breath sounds without respiratory distress, no leg swelling.    Discussed case with supervising physician, recommended checking pulse ox and HR while ambulating and adding cough suppressant to current treatment plan.   Discussed with pt treatment for Acute bronchitis- reviewed chest xray no acute cardiopulmonary process. Pulse ox at 97% dropped to 96% and HR went up from 78 to 82 while ambulating. Low suspicion for CHF. Advised to continue treatment as prescribed before with prednisone, albuterol inhaler with spacer and doxycycline. Advised to continue Mucinex with plenty of fluids. May use cough suppressant Benzonatate up to 3 times a day as needed for spasmatic cough and dextromethorphan-menthol lozenges every 4-6 hr as needed. Recommended incentive spirometry couple deep breaths every 1 hr while awake. Recommend flutter valve to help break up chest congestion.  GO to ER if worsening difficulty breathing or chest pain or dizziness.  Orders and Diagnoses  Diagnoses and  all orders for this visit:  Cough, unspecified type  -     XR chest 2 views; Future  -     DM-benzocaine-menthol 5-6-10 mg lozenge; Use one lozenge every 4-6 hours as needed for cough  -     benzonatate (Tessalon) 200 mg capsule; Take 1 capsule (200 mg) by mouth 3 times a day as needed for cough for up to 7 days. Do not crush or chew.      Medical Admin Record      Patient disposition: Home    Electronically signed by Kath Renae PA-C  8:11 PM           [1] (Not in a hospital admission)   [2]   Past Medical History:  Diagnosis Date    Disorder of urinary system, unspecified     Urinary problem in male    Personal history of other diseases of the circulatory system     History of hypertension    Personal history of other mental and behavioral disorders     History of depression    Tremor, unspecified     Occasional tremors   [3] No past surgical history on file.

## 2025-06-30 NOTE — PATIENT INSTRUCTIONS
Acute bronchitis- reviewed chest xray no acute cardiopulmonary process. Pulse ox at 97% dropped to 96% and HR went up from 78 to 82. Advised to continue treatment as prescribed before with prednisone, albuterol inhaler with spacer and doxycycline. Advised to continue Mucinex with plenty of fluids. May use cough suppressant Benzonatate up to 3 times a day as needed for spasmatic cough and dextromethorphan-menthol lozenges every 4-6 hr as needed. Recommended incentive spirometry couple deep breaths every 1 hr while awake. Recommend flutter valve to help break up chest congestion.  GO to ER if worsening difficulty breathing or chest pain or dizziness.

## 2025-07-28 ENCOUNTER — APPOINTMENT (OUTPATIENT)
Dept: DERMATOLOGY | Facility: CLINIC | Age: 60
End: 2025-07-28
Payer: COMMERCIAL